# Patient Record
Sex: FEMALE | Race: WHITE | NOT HISPANIC OR LATINO | Employment: OTHER | URBAN - METROPOLITAN AREA
[De-identification: names, ages, dates, MRNs, and addresses within clinical notes are randomized per-mention and may not be internally consistent; named-entity substitution may affect disease eponyms.]

---

## 2018-08-30 ENCOUNTER — OFFICE VISIT (OUTPATIENT)
Dept: MEDICAL GROUP | Facility: PHYSICIAN GROUP | Age: 68
End: 2018-08-30
Payer: MEDICARE

## 2018-08-30 VITALS
BODY MASS INDEX: 27.32 KG/M2 | RESPIRATION RATE: 18 BRPM | DIASTOLIC BLOOD PRESSURE: 70 MMHG | HEART RATE: 71 BPM | OXYGEN SATURATION: 95 % | SYSTOLIC BLOOD PRESSURE: 142 MMHG | HEIGHT: 66 IN | WEIGHT: 170 LBS | TEMPERATURE: 98.9 F

## 2018-08-30 DIAGNOSIS — R42 VERTIGO: ICD-10-CM

## 2018-08-30 PROCEDURE — 99214 OFFICE O/P EST MOD 30 MIN: CPT | Performed by: FAMILY MEDICINE

## 2018-08-30 RX ORDER — MECLIZINE HYDROCHLORIDE 25 MG/1
25 TABLET ORAL 3 TIMES DAILY PRN
Qty: 30 TAB | Refills: 0 | Status: SHIPPED | OUTPATIENT
Start: 2018-08-30 | End: 2022-04-11

## 2018-08-30 ASSESSMENT — ENCOUNTER SYMPTOMS
HEARTBURN: 0
DEPRESSION: 0
BLURRED VISION: 0
DIZZINESS: 1
ABDOMINAL PAIN: 0
MYALGIAS: 0
NAUSEA: 0
CARDIOVASCULAR NEGATIVE: 1
CONSTITUTIONAL NEGATIVE: 1
CHANGE IN BOWEL HABIT: 0
TINGLING: 0
COUGH: 0
BRUISES/BLEEDS EASILY: 0
FEVER: 0
PALPITATIONS: 0
HEMOPTYSIS: 0
CHILLS: 0
ARTHRALGIAS: 0
DOUBLE VISION: 0
GASTROINTESTINAL NEGATIVE: 1
EYES NEGATIVE: 1
HEADACHES: 0
PSYCHIATRIC NEGATIVE: 1
RESPIRATORY NEGATIVE: 1
MUSCULOSKELETAL NEGATIVE: 1

## 2018-08-30 ASSESSMENT — PATIENT HEALTH QUESTIONNAIRE - PHQ9: CLINICAL INTERPRETATION OF PHQ2 SCORE: 0

## 2018-08-30 NOTE — PROGRESS NOTES
Subjective:      Yamila Srinivasan is a 68 y.o. female who presents with Dizziness            Many year history of vertigo, worse when lying in bed and moving head and neck up  No other neuro sx, seems to be worse lately, no cough or sob  1. Vertigo  =  - CT-CTA HEAD WITH & W/O-POST PROCESS; Future  - CT-HEAD W/O; Future  - COMP METABOLIC PANEL; Future  - FREE THYROXINE; Future  - TRIIDOTHYRONINE; Future  - TSH; Future  - CBC WITH DIFFERENTIAL; Future  - meclizine (ANTIVERT) 25 MG Tab; Take 1 Tab by mouth 3 times a day as needed.  Dispense: 30 Tab; Refill: 0    Past Medical History:  No date: Arthritis  Past Surgical History:  No date: CHOLECYSTECTOMY  Smoking status: Never Smoker                                                              Smokeless tobacco: Never Used                      Alcohol use: No              Review of patient's family history indicates:  Problem: Diabetes      Relation: Mother       Age of Onset: (Not Specified)       Current Outpatient Prescriptions: •  meclizine (ANTIVERT) 25 MG Tab, Take 1 Tab by mouth 3 times a day as needed., Disp: 30 Tab, Rfl: 0•  naproxen (NAPROSYN) 500 MG Tab, Take 1 Tab by mouth 2 times a day, with meals., Disp: 60 Tab, Rfl: 3•  etodolac (LODINE) 200 MG Cap, Take 1 Cap by mouth 2 times a day., Disp: 60 Cap, Rfl: 3•  NON SPECIFIED, Apply 1 Units to affected area(s) every day., Disp: 1 Units, Rfl: 0    Patient was instructed on the use of medications, either prescriptions or OTC and informed on when the appropriate follow up time period should be. In addition, patient was also instructed that should any acute worsening occur that they should notify this clinic asap or call 911.          Dizziness   This is a chronic problem. The current episode started more than 1 year ago. The problem occurs intermittently. The problem has been waxing and waning. Pertinent negatives include no abdominal pain, arthralgias, change in bowel habit, chest pain, chills, coughing,  "fever, headaches, myalgias, nausea or rash. The symptoms are aggravated by exertion.       Review of Systems   Constitutional: Negative.  Negative for chills and fever.   HENT: Negative.  Negative for hearing loss.    Eyes: Negative.  Negative for blurred vision and double vision.   Respiratory: Negative.  Negative for cough and hemoptysis.    Cardiovascular: Negative.  Negative for chest pain and palpitations.   Gastrointestinal: Negative.  Negative for abdominal pain, change in bowel habit, heartburn and nausea.   Genitourinary: Negative.  Negative for dysuria.   Musculoskeletal: Negative.  Negative for arthralgias and myalgias.   Skin: Negative.  Negative for rash.   Neurological: Positive for dizziness. Negative for tingling and headaches.   Endo/Heme/Allergies: Negative.  Does not bruise/bleed easily.   Psychiatric/Behavioral: Negative.  Negative for depression and suicidal ideas.   All other systems reviewed and are negative.         Objective:     /70   Pulse 71   Temp 37.2 °C (98.9 °F)   Resp 18   Ht 1.676 m (5' 6\")   Wt 77.1 kg (170 lb)   SpO2 95%   BMI 27.44 kg/m²      Physical Exam   Constitutional: She is oriented to person, place, and time. She appears well-developed and well-nourished. No distress.   HENT:   Head: Normocephalic and atraumatic.   Mouth/Throat: Oropharynx is clear and moist. No oropharyngeal exudate.   Eyes: Pupils are equal, round, and reactive to light.   Cardiovascular: Normal rate, regular rhythm, normal heart sounds and intact distal pulses.  Exam reveals no gallop and no friction rub.    No murmur heard.  Pulmonary/Chest: Effort normal and breath sounds normal. No respiratory distress. She has no wheezes. She has no rales. She exhibits no tenderness.   Neurological: She is alert and oriented to person, place, and time. She displays normal reflexes. No cranial nerve deficit or sensory deficit. She exhibits normal muscle tone. Coordination normal.   Skin: She is not " diaphoretic.   Psychiatric: She has a normal mood and affect. Her behavior is normal. Judgment and thought content normal.   Nursing note and vitals reviewed.              Assessment/Plan:     1. Vertigo  - CT-CTA HEAD WITH & W/O-POST PROCESS; Future  - CT-HEAD W/O; Future  - COMP METABOLIC PANEL; Future  - FREE THYROXINE; Future  - TRIIDOTHYRONINE; Future  - TSH; Future  - CBC WITH DIFFERENTIAL; Future  - meclizine (ANTIVERT) 25 MG Tab; Take 1 Tab by mouth 3 times a day as needed.  Dispense: 30 Tab; Refill: 0

## 2018-08-31 ENCOUNTER — HOSPITAL ENCOUNTER (OUTPATIENT)
Dept: LAB | Facility: MEDICAL CENTER | Age: 68
End: 2018-08-31
Attending: FAMILY MEDICINE
Payer: MEDICARE

## 2018-08-31 DIAGNOSIS — R42 VERTIGO: ICD-10-CM

## 2018-08-31 LAB
ALBUMIN SERPL BCP-MCNC: 3.9 G/DL (ref 3.2–4.9)
ALBUMIN/GLOB SERPL: 1.3 G/DL
ALP SERPL-CCNC: 59 U/L (ref 30–99)
ALT SERPL-CCNC: 38 U/L (ref 2–50)
ANION GAP SERPL CALC-SCNC: 7 MMOL/L (ref 0–11.9)
AST SERPL-CCNC: 25 U/L (ref 12–45)
BASOPHILS # BLD AUTO: 0.6 % (ref 0–1.8)
BASOPHILS # BLD: 0.04 K/UL (ref 0–0.12)
BILIRUB SERPL-MCNC: 0.7 MG/DL (ref 0.1–1.5)
BUN SERPL-MCNC: 12 MG/DL (ref 8–22)
CALCIUM SERPL-MCNC: 8.5 MG/DL (ref 8.5–10.5)
CHLORIDE SERPL-SCNC: 104 MMOL/L (ref 96–112)
CO2 SERPL-SCNC: 26 MMOL/L (ref 20–33)
CREAT SERPL-MCNC: 0.71 MG/DL (ref 0.5–1.4)
EOSINOPHIL # BLD AUTO: 0.55 K/UL (ref 0–0.51)
EOSINOPHIL NFR BLD: 8.1 % (ref 0–6.9)
ERYTHROCYTE [DISTWIDTH] IN BLOOD BY AUTOMATED COUNT: 41.3 FL (ref 35.9–50)
GLOBULIN SER CALC-MCNC: 3.1 G/DL (ref 1.9–3.5)
GLUCOSE SERPL-MCNC: 114 MG/DL (ref 65–99)
HCT VFR BLD AUTO: 42.7 % (ref 37–47)
HGB BLD-MCNC: 14 G/DL (ref 12–16)
IMM GRANULOCYTES # BLD AUTO: 0.02 K/UL (ref 0–0.11)
IMM GRANULOCYTES NFR BLD AUTO: 0.3 % (ref 0–0.9)
LYMPHOCYTES # BLD AUTO: 2.41 K/UL (ref 1–4.8)
LYMPHOCYTES NFR BLD: 35.7 % (ref 22–41)
MCH RBC QN AUTO: 29.2 PG (ref 27–33)
MCHC RBC AUTO-ENTMCNC: 32.8 G/DL (ref 33.6–35)
MCV RBC AUTO: 89.1 FL (ref 81.4–97.8)
MONOCYTES # BLD AUTO: 0.54 K/UL (ref 0–0.85)
MONOCYTES NFR BLD AUTO: 8 % (ref 0–13.4)
NEUTROPHILS # BLD AUTO: 3.19 K/UL (ref 2–7.15)
NEUTROPHILS NFR BLD: 47.3 % (ref 44–72)
NRBC # BLD AUTO: 0 K/UL
NRBC BLD-RTO: 0 /100 WBC
PLATELET # BLD AUTO: 340 K/UL (ref 164–446)
PMV BLD AUTO: 8.7 FL (ref 9–12.9)
POTASSIUM SERPL-SCNC: 3.9 MMOL/L (ref 3.6–5.5)
PROT SERPL-MCNC: 7 G/DL (ref 6–8.2)
RBC # BLD AUTO: 4.79 M/UL (ref 4.2–5.4)
SODIUM SERPL-SCNC: 137 MMOL/L (ref 135–145)
T3 SERPL-MCNC: 89.4 NG/DL (ref 60–181)
T4 FREE SERPL-MCNC: 0.84 NG/DL (ref 0.53–1.43)
TSH SERPL DL<=0.005 MIU/L-ACNC: 2.03 UIU/ML (ref 0.38–5.33)
WBC # BLD AUTO: 6.8 K/UL (ref 4.8–10.8)

## 2018-08-31 PROCEDURE — 80053 COMPREHEN METABOLIC PANEL: CPT

## 2018-08-31 PROCEDURE — 84443 ASSAY THYROID STIM HORMONE: CPT | Mod: GA

## 2018-08-31 PROCEDURE — 84480 ASSAY TRIIODOTHYRONINE (T3): CPT

## 2018-08-31 PROCEDURE — 85025 COMPLETE CBC W/AUTO DIFF WBC: CPT

## 2018-08-31 PROCEDURE — 84439 ASSAY OF FREE THYROXINE: CPT | Mod: GA

## 2018-08-31 PROCEDURE — 36415 COLL VENOUS BLD VENIPUNCTURE: CPT

## 2018-09-04 ENCOUNTER — TELEPHONE (OUTPATIENT)
Dept: MEDICAL GROUP | Facility: PHYSICIAN GROUP | Age: 68
End: 2018-09-04

## 2019-04-25 DIAGNOSIS — Z12.31 VISIT FOR SCREENING MAMMOGRAM: ICD-10-CM

## 2019-05-15 ENCOUNTER — HOSPITAL ENCOUNTER (OUTPATIENT)
Dept: LAB | Facility: MEDICAL CENTER | Age: 69
End: 2019-05-15
Attending: FAMILY MEDICINE
Payer: MEDICARE

## 2019-05-15 ENCOUNTER — OFFICE VISIT (OUTPATIENT)
Dept: MEDICAL GROUP | Facility: PHYSICIAN GROUP | Age: 69
End: 2019-05-15
Payer: MEDICARE

## 2019-05-15 VITALS
BODY MASS INDEX: 27.64 KG/M2 | OXYGEN SATURATION: 95 % | WEIGHT: 172 LBS | HEIGHT: 66 IN | SYSTOLIC BLOOD PRESSURE: 140 MMHG | RESPIRATION RATE: 12 BRPM | TEMPERATURE: 97.5 F | HEART RATE: 76 BPM | DIASTOLIC BLOOD PRESSURE: 70 MMHG

## 2019-05-15 DIAGNOSIS — B35.4 TINEA CORPORIS: ICD-10-CM

## 2019-05-15 DIAGNOSIS — K92.1 BLOOD IN STOOL: ICD-10-CM

## 2019-05-15 DIAGNOSIS — Z78.0 POST-MENOPAUSAL: ICD-10-CM

## 2019-05-15 LAB
ALBUMIN SERPL BCP-MCNC: 4.1 G/DL (ref 3.2–4.9)
ALBUMIN/GLOB SERPL: 1.3 G/DL
ALP SERPL-CCNC: 66 U/L (ref 30–99)
ALT SERPL-CCNC: 27 U/L (ref 2–50)
ANION GAP SERPL CALC-SCNC: 9 MMOL/L (ref 0–11.9)
AST SERPL-CCNC: 18 U/L (ref 12–45)
BASOPHILS # BLD AUTO: 0.7 % (ref 0–1.8)
BASOPHILS # BLD: 0.05 K/UL (ref 0–0.12)
BILIRUB SERPL-MCNC: 0.4 MG/DL (ref 0.1–1.5)
BUN SERPL-MCNC: 15 MG/DL (ref 8–22)
CALCIUM SERPL-MCNC: 8.6 MG/DL (ref 8.5–10.5)
CHLORIDE SERPL-SCNC: 106 MMOL/L (ref 96–112)
CO2 SERPL-SCNC: 25 MMOL/L (ref 20–33)
CREAT SERPL-MCNC: 0.6 MG/DL (ref 0.5–1.4)
EOSINOPHIL # BLD AUTO: 0.32 K/UL (ref 0–0.51)
EOSINOPHIL NFR BLD: 4.3 % (ref 0–6.9)
ERYTHROCYTE [DISTWIDTH] IN BLOOD BY AUTOMATED COUNT: 41.8 FL (ref 35.9–50)
FASTING STATUS PATIENT QL REPORTED: NORMAL
GLOBULIN SER CALC-MCNC: 3.1 G/DL (ref 1.9–3.5)
GLUCOSE SERPL-MCNC: 109 MG/DL (ref 65–99)
HCT VFR BLD AUTO: 46.2 % (ref 37–47)
HGB BLD-MCNC: 14.4 G/DL (ref 12–16)
IMM GRANULOCYTES # BLD AUTO: 0.03 K/UL (ref 0–0.11)
IMM GRANULOCYTES NFR BLD AUTO: 0.4 % (ref 0–0.9)
LYMPHOCYTES # BLD AUTO: 1.83 K/UL (ref 1–4.8)
LYMPHOCYTES NFR BLD: 24.8 % (ref 22–41)
MCH RBC QN AUTO: 28 PG (ref 27–33)
MCHC RBC AUTO-ENTMCNC: 31.2 G/DL (ref 33.6–35)
MCV RBC AUTO: 89.7 FL (ref 81.4–97.8)
MONOCYTES # BLD AUTO: 0.5 K/UL (ref 0–0.85)
MONOCYTES NFR BLD AUTO: 6.8 % (ref 0–13.4)
NEUTROPHILS # BLD AUTO: 4.64 K/UL (ref 2–7.15)
NEUTROPHILS NFR BLD: 63 % (ref 44–72)
NRBC # BLD AUTO: 0 K/UL
NRBC BLD-RTO: 0 /100 WBC
PLATELET # BLD AUTO: 367 K/UL (ref 164–446)
PMV BLD AUTO: 8.6 FL (ref 9–12.9)
POTASSIUM SERPL-SCNC: 4.2 MMOL/L (ref 3.6–5.5)
PROT SERPL-MCNC: 7.2 G/DL (ref 6–8.2)
RBC # BLD AUTO: 5.15 M/UL (ref 4.2–5.4)
SODIUM SERPL-SCNC: 140 MMOL/L (ref 135–145)
WBC # BLD AUTO: 7.4 K/UL (ref 4.8–10.8)

## 2019-05-15 PROCEDURE — 99214 OFFICE O/P EST MOD 30 MIN: CPT | Performed by: FAMILY MEDICINE

## 2019-05-15 PROCEDURE — 85025 COMPLETE CBC W/AUTO DIFF WBC: CPT

## 2019-05-15 PROCEDURE — 80053 COMPREHEN METABOLIC PANEL: CPT

## 2019-05-15 PROCEDURE — 36415 COLL VENOUS BLD VENIPUNCTURE: CPT

## 2019-05-15 RX ORDER — CLOTRIMAZOLE AND BETAMETHASONE DIPROPIONATE 10; .64 MG/G; MG/G
1 CREAM TOPICAL 2 TIMES DAILY
Qty: 1 TUBE | Refills: 4 | Status: SHIPPED | OUTPATIENT
Start: 2019-05-15 | End: 2022-04-11

## 2019-05-15 ASSESSMENT — ENCOUNTER SYMPTOMS
HEMOPTYSIS: 0
CONSTITUTIONAL NEGATIVE: 1
BLURRED VISION: 0
BLOOD IN STOOL: 1
RESPIRATORY NEGATIVE: 1
CHILLS: 0
FEVER: 0
EYES NEGATIVE: 1
DIZZINESS: 0
COUGH: 0
DEPRESSION: 0
NAUSEA: 0
CARDIOVASCULAR NEGATIVE: 1
MUSCULOSKELETAL NEGATIVE: 1
PALPITATIONS: 0
DOUBLE VISION: 0
NEUROLOGICAL NEGATIVE: 1
PSYCHIATRIC NEGATIVE: 1
HEADACHES: 0
TINGLING: 0
MYALGIAS: 0
BRUISES/BLEEDS EASILY: 0
HEARTBURN: 0

## 2019-05-15 NOTE — PROGRESS NOTES
Subjective:      Yamila Srinivasan is a 69 y.o. female who presents with Back Pain; Rectal Bleeding; Nodule (on arm ); and Fatigue            1. Post-menopausal    - DS-BONE DENSITY STUDY (DEXA); Future    2. Tinea corporis  Rash on left arm for past 5 mo  4 mm by 4 mm scaly patch on left ventral forearm  - clotrimazole-betamethasone (LOTRISONE) 1-0.05 % Cream; Apply 1 g to affected area(s) 2 times a day.  Dispense: 1 Tube; Refill: 4    3. Blood in stool  Notices blood in stool over past several months intermittently with BM  No constitutional sx  2016 colonoscopy with grade 1 internal hemorrhoid  Today on exam external hemorrhoids present and guaiac neg  Will need new eval by gi to see if this past noticed internal one is the cause vs something else  - REFERRAL TO GASTROENTEROLOGY  - Comp Metabolic Panel; Future  - CBC WITH DIFFERENTIAL; Future    Past Medical History:  No date: Arthritis  Past Surgical History:  No date: CHOLECYSTECTOMY  Smoking status: Never Smoker                                                              Smokeless tobacco: Never Used                      Alcohol use: No              Review of patient's family history indicates:  Problem: Diabetes      Relation: Mother       Age of Onset: (Not Specified)       Current Outpatient Prescriptions: •  clotrimazole-betamethasone (LOTRISONE) 1-0.05 % Cream, Apply 1 g to affected area(s) 2 times a day., Disp: 1 Tube, Rfl: 4•  meclizine (ANTIVERT) 25 MG Tab, Take 1 Tab by mouth 3 times a day as needed. (Patient not taking: Reported on 5/15/2019), Disp: 30 Tab, Rfl: 0•  naproxen (NAPROSYN) 500 MG Tab, Take 1 Tab by mouth 2 times a day, with meals. (Patient not taking: Reported on 5/15/2019), Disp: 60 Tab, Rfl: 3•  etodolac (LODINE) 200 MG Cap, Take 1 Cap by mouth 2 times a day. (Patient not taking: Reported on 5/15/2019), Disp: 60 Cap, Rfl: 3•  NON SPECIFIED, Apply 1 Units to affected area(s) every day. (Patient not taking: Reported on 5/15/2019),  "Disp: 1 Units, Rfl: 0    Patient was instructed on the use of medications, either prescriptions or OTC and informed on when the appropriate follow up time period should be. In addition, patient was also instructed that should any acute worsening occur that they should notify this clinic asap or call 911.            Review of Systems   Constitutional: Negative.  Negative for chills and fever.   HENT: Negative.  Negative for hearing loss.    Eyes: Negative.  Negative for blurred vision and double vision.   Respiratory: Negative.  Negative for cough and hemoptysis.    Cardiovascular: Negative.  Negative for chest pain and palpitations.   Gastrointestinal: Positive for blood in stool. Negative for heartburn and nausea.   Genitourinary: Negative.  Negative for dysuria.   Musculoskeletal: Negative.  Negative for myalgias.   Skin: Positive for itching and rash.   Neurological: Negative.  Negative for dizziness, tingling and headaches.   Endo/Heme/Allergies: Negative.  Does not bruise/bleed easily.   Psychiatric/Behavioral: Negative.  Negative for depression and suicidal ideas.   All other systems reviewed and are negative.         Objective:     /70   Pulse 76   Temp 36.4 °C (97.5 °F)   Resp 12   Ht 1.676 m (5' 6\")   Wt 78 kg (172 lb)   SpO2 95%   BMI 27.76 kg/m²      Physical Exam   Constitutional: She is oriented to person, place, and time. She appears well-developed and well-nourished. No distress.   HENT:   Head: Normocephalic and atraumatic.   Mouth/Throat: Oropharynx is clear and moist. No oropharyngeal exudate.   Eyes: Pupils are equal, round, and reactive to light.   Cardiovascular: Normal rate, regular rhythm, normal heart sounds and intact distal pulses.  Exam reveals no gallop and no friction rub.    No murmur heard.  Pulmonary/Chest: Effort normal and breath sounds normal. No respiratory distress. She has no wheezes. She has no rales. She exhibits no tenderness.   Genitourinary: Rectal exam shows " external hemorrhoid. Rectal exam shows guaiac negative stool.   Neurological: She is alert and oriented to person, place, and time.   Skin: She is not diaphoretic.        Psychiatric: She has a normal mood and affect. Her behavior is normal. Judgment and thought content normal.   Nursing note and vitals reviewed.              Assessment/Plan:     1. Post-menopausal    - DS-BONE DENSITY STUDY (DEXA); Future    2. Tinea corporis    - clotrimazole-betamethasone (LOTRISONE) 1-0.05 % Cream; Apply 1 g to affected area(s) 2 times a day.  Dispense: 1 Tube; Refill: 4    3. Blood in stool    - REFERRAL TO GASTROENTEROLOGY  - Comp Metabolic Panel; Future  - CBC WITH DIFFERENTIAL; Future

## 2019-05-16 ENCOUNTER — TELEPHONE (OUTPATIENT)
Dept: MEDICAL GROUP | Facility: PHYSICIAN GROUP | Age: 69
End: 2019-05-16

## 2019-05-17 NOTE — TELEPHONE ENCOUNTER
----- Message from Daniel Zuleta M.D. sent at 5/16/2019  1:15 PM PDT -----  Red blood cell count ok

## 2020-10-21 ENCOUNTER — OFFICE VISIT (OUTPATIENT)
Dept: MEDICAL GROUP | Facility: PHYSICIAN GROUP | Age: 70
End: 2020-10-21
Payer: MEDICARE

## 2020-10-21 ENCOUNTER — NURSE TRIAGE (OUTPATIENT)
Dept: HEALTH INFORMATION MANAGEMENT | Facility: OTHER | Age: 70
End: 2020-10-21

## 2020-10-21 VITALS
TEMPERATURE: 98 F | DIASTOLIC BLOOD PRESSURE: 72 MMHG | OXYGEN SATURATION: 96 % | HEART RATE: 77 BPM | WEIGHT: 174.4 LBS | SYSTOLIC BLOOD PRESSURE: 140 MMHG | RESPIRATION RATE: 20 BRPM | BODY MASS INDEX: 28.03 KG/M2 | HEIGHT: 66 IN

## 2020-10-21 DIAGNOSIS — R53.81 MALAISE AND FATIGUE: ICD-10-CM

## 2020-10-21 DIAGNOSIS — R53.83 MALAISE AND FATIGUE: ICD-10-CM

## 2020-10-21 PROCEDURE — 99214 OFFICE O/P EST MOD 30 MIN: CPT | Performed by: FAMILY MEDICINE

## 2020-10-21 RX ORDER — CEPHALEXIN 500 MG/1
500 CAPSULE ORAL 2 TIMES DAILY
COMMUNITY
Start: 2020-10-19 | End: 2022-04-11

## 2020-10-21 ASSESSMENT — ENCOUNTER SYMPTOMS
NAUSEA: 0
MYALGIAS: 0
CARDIOVASCULAR NEGATIVE: 1
EYES NEGATIVE: 1
BLURRED VISION: 0
PSYCHIATRIC NEGATIVE: 1
CHILLS: 0
GASTROINTESTINAL NEGATIVE: 1
MUSCULOSKELETAL NEGATIVE: 1
DEPRESSION: 0
DOUBLE VISION: 0
RESPIRATORY NEGATIVE: 1
DIZZINESS: 0
HEMOPTYSIS: 0
HEARTBURN: 0
COUGH: 0
NEUROLOGICAL NEGATIVE: 1
HEADACHES: 0
BRUISES/BLEEDS EASILY: 0
TINGLING: 0
FEVER: 0
PALPITATIONS: 0

## 2020-10-21 ASSESSMENT — PATIENT HEALTH QUESTIONNAIRE - PHQ9: CLINICAL INTERPRETATION OF PHQ2 SCORE: 0

## 2020-10-21 ASSESSMENT — FIBROSIS 4 INDEX: FIB4 SCORE: 0.64

## 2020-10-21 NOTE — TELEPHONE ENCOUNTER
1. Caller Name:Yamila Srinivasan                 Call Back Number:  [facility] PCP or Specialty Provider: Yes         2.  In the last two weeks, has the patient had any new or worsening symptoms (not explained by alternative diagnosis)? Yes, the patient reports the following COVID-19 consistent symptoms: fatigue.    3.  Does patient have any comoribidities? Sleep apnea, UTI on antibiotics    4.  Has the patient traveled in the last 14 days OR had any known contact with someone who is suspected or confirmed to have COVID-19?  No.    5. Disposition: Cleared by RN Triage as potential is low for COVID-19; OK to keep/schedule appointment    Note routed to [facility] Provider: BRIGHT only.

## 2020-10-21 NOTE — PROGRESS NOTES
Subjective:      Yamila Srinivasan is a 70 y.o. female who presents with Tired (No energy since Sunday/complete exhaustion)            1. Malaise and fatigue  Very fatigued for past 4 days, started in the morning and also with a uti that is being treated by keflex  No energy  Denies fever or chills or cough or bleeding  - TSH; Future  - TRIIDOTHYRONINE; Future  - VITAMIN D,25 HYDROXY; Future  - FREE THYROXINE; Future  - Comp Metabolic Panel; Future  - CBC WITH DIFFERENTIAL; Future  - VITAMIN B12; Future  - FOLATE; Future    Past Medical History:  No date: Arthritis  Past Surgical History:  No date: CHOLECYSTECTOMY  Social History    Tobacco Use      Smoking status: Never Smoker      Smokeless tobacco: Never Used    Alcohol use: No    Drug use: No    Review of patient's family history indicates:  Problem: Diabetes      Relation: Mother          Age of Onset: (Not Specified)      Current Outpatient Medications: •  cephALEXin (KEFLEX) 500 MG Cap, Take 500 mg by mouth 2 Times a Day., Disp: , Rfl: •  clotrimazole-betamethasone (LOTRISONE) 1-0.05 % Cream, Apply 1 g to affected area(s) 2 times a day. (Patient not taking: Reported on 10/21/2020), Disp: 1 Tube, Rfl: 4•  meclizine (ANTIVERT) 25 MG Tab, Take 1 Tab by mouth 3 times a day as needed. (Patient not taking: Reported on 5/15/2019), Disp: 30 Tab, Rfl: 0•  naproxen (NAPROSYN) 500 MG Tab, Take 1 Tab by mouth 2 times a day, with meals. (Patient not taking: Reported on 5/15/2019), Disp: 60 Tab, Rfl: 3•  etodolac (LODINE) 200 MG Cap, Take 1 Cap by mouth 2 times a day. (Patient not taking: Reported on 5/15/2019), Disp: 60 Cap, Rfl: 3•  NON SPECIFIED, Apply 1 Units to affected area(s) every day. (Patient not taking: Reported on 5/15/2019), Disp: 1 Units, Rfl: 0    Patient was instructed on the use of medications, either prescriptions or OTC and informed on when the appropriate follow up time period should be. In addition, patient was also instructed that should any acute  "worsening occur that they should notify this clinic asap or call 911.          Review of Systems   Constitutional: Positive for malaise/fatigue. Negative for chills and fever.   HENT: Negative.  Negative for hearing loss.    Eyes: Negative.  Negative for blurred vision and double vision.   Respiratory: Negative.  Negative for cough and hemoptysis.    Cardiovascular: Negative.  Negative for chest pain and palpitations.   Gastrointestinal: Negative.  Negative for heartburn and nausea.   Genitourinary: Positive for dysuria.   Musculoskeletal: Negative.  Negative for myalgias.   Skin: Negative.  Negative for rash.   Neurological: Negative.  Negative for dizziness, tingling and headaches.   Endo/Heme/Allergies: Negative.  Does not bruise/bleed easily.   Psychiatric/Behavioral: Negative.  Negative for depression and suicidal ideas.   All other systems reviewed and are negative.         Objective:     /72 (BP Location: Left arm, Patient Position: Sitting, BP Cuff Size: Adult)   Pulse 77   Temp 36.7 °C (98 °F) (Temporal)   Resp 20   Ht 1.664 m (5' 5.5\")   Wt 79.1 kg (174 lb 6.4 oz)   SpO2 96%   BMI 28.58 kg/m²      Physical Exam  Vitals signs and nursing note reviewed.   Constitutional:       General: She is not in acute distress.     Appearance: She is well-developed. She is not diaphoretic.   HENT:      Head: Normocephalic and atraumatic.      Mouth/Throat:      Pharynx: No oropharyngeal exudate.   Eyes:      Pupils: Pupils are equal, round, and reactive to light.   Cardiovascular:      Rate and Rhythm: Normal rate and regular rhythm.      Heart sounds: Normal heart sounds. No murmur. No friction rub. No gallop.    Pulmonary:      Effort: Pulmonary effort is normal. No respiratory distress.      Breath sounds: Normal breath sounds. No wheezing or rales.   Chest:      Chest wall: No tenderness.   Neurological:      Mental Status: She is alert and oriented to person, place, and time.   Psychiatric:         " Behavior: Behavior normal.         Thought Content: Thought content normal.         Judgment: Judgment normal.                 Assessment/Plan:        1. Malaise and fatigue    - TSH; Future  - TRIIDOTHYRONINE; Future  - VITAMIN D,25 HYDROXY; Future  - FREE THYROXINE; Future  - Comp Metabolic Panel; Future  - CBC WITH DIFFERENTIAL; Future  - VITAMIN B12; Future  - FOLATE; Future

## 2020-10-22 ENCOUNTER — HOSPITAL ENCOUNTER (OUTPATIENT)
Dept: LAB | Facility: MEDICAL CENTER | Age: 70
End: 2020-10-22
Attending: FAMILY MEDICINE
Payer: MEDICARE

## 2020-10-22 DIAGNOSIS — R53.81 MALAISE AND FATIGUE: ICD-10-CM

## 2020-10-22 DIAGNOSIS — R53.83 MALAISE AND FATIGUE: ICD-10-CM

## 2020-10-22 LAB
25(OH)D3 SERPL-MCNC: 21 NG/ML (ref 30–100)
ALBUMIN SERPL BCP-MCNC: 3.6 G/DL (ref 3.2–4.9)
ALBUMIN/GLOB SERPL: 0.9 G/DL
ALP SERPL-CCNC: 174 U/L (ref 30–99)
ALT SERPL-CCNC: 32 U/L (ref 2–50)
ANION GAP SERPL CALC-SCNC: 15 MMOL/L (ref 7–16)
AST SERPL-CCNC: 22 U/L (ref 12–45)
BASOPHILS # BLD AUTO: 0.6 % (ref 0–1.8)
BASOPHILS # BLD: 0.06 K/UL (ref 0–0.12)
BILIRUB SERPL-MCNC: 0.4 MG/DL (ref 0.1–1.5)
BUN SERPL-MCNC: 14 MG/DL (ref 8–22)
CALCIUM SERPL-MCNC: 8.9 MG/DL (ref 8.5–10.5)
CHLORIDE SERPL-SCNC: 99 MMOL/L (ref 96–112)
CO2 SERPL-SCNC: 26 MMOL/L (ref 20–33)
CREAT SERPL-MCNC: 0.71 MG/DL (ref 0.5–1.4)
EOSINOPHIL # BLD AUTO: 0.23 K/UL (ref 0–0.51)
EOSINOPHIL NFR BLD: 2.3 % (ref 0–6.9)
ERYTHROCYTE [DISTWIDTH] IN BLOOD BY AUTOMATED COUNT: 43.6 FL (ref 35.9–50)
FOLATE SERPL-MCNC: 17.8 NG/ML
GLOBULIN SER CALC-MCNC: 4.1 G/DL (ref 1.9–3.5)
GLUCOSE SERPL-MCNC: 94 MG/DL (ref 65–99)
HCT VFR BLD AUTO: 40.8 % (ref 37–47)
HGB BLD-MCNC: 12.8 G/DL (ref 12–16)
IMM GRANULOCYTES # BLD AUTO: 0.12 K/UL (ref 0–0.11)
IMM GRANULOCYTES NFR BLD AUTO: 1.2 % (ref 0–0.9)
LYMPHOCYTES # BLD AUTO: 2.01 K/UL (ref 1–4.8)
LYMPHOCYTES NFR BLD: 19.8 % (ref 22–41)
MCH RBC QN AUTO: 28.4 PG (ref 27–33)
MCHC RBC AUTO-ENTMCNC: 31.4 G/DL (ref 33.6–35)
MCV RBC AUTO: 90.5 FL (ref 81.4–97.8)
MONOCYTES # BLD AUTO: 1.1 K/UL (ref 0–0.85)
MONOCYTES NFR BLD AUTO: 10.9 % (ref 0–13.4)
NEUTROPHILS # BLD AUTO: 6.61 K/UL (ref 2–7.15)
NEUTROPHILS NFR BLD: 65.2 % (ref 44–72)
NRBC # BLD AUTO: 0 K/UL
NRBC BLD-RTO: 0 /100 WBC
PLATELET # BLD AUTO: 412 K/UL (ref 164–446)
PMV BLD AUTO: 9 FL (ref 9–12.9)
POTASSIUM SERPL-SCNC: 3.4 MMOL/L (ref 3.6–5.5)
PROT SERPL-MCNC: 7.7 G/DL (ref 6–8.2)
RBC # BLD AUTO: 4.51 M/UL (ref 4.2–5.4)
SODIUM SERPL-SCNC: 140 MMOL/L (ref 135–145)
T3 SERPL-MCNC: 81.9 NG/DL (ref 60–181)
T4 FREE SERPL-MCNC: 1.3 NG/DL (ref 0.93–1.7)
TSH SERPL DL<=0.005 MIU/L-ACNC: 2.66 UIU/ML (ref 0.38–5.33)
VIT B12 SERPL-MCNC: 2366 PG/ML (ref 211–911)
WBC # BLD AUTO: 10.1 K/UL (ref 4.8–10.8)

## 2020-10-22 PROCEDURE — 85025 COMPLETE CBC W/AUTO DIFF WBC: CPT

## 2020-10-22 PROCEDURE — 84439 ASSAY OF FREE THYROXINE: CPT

## 2020-10-22 PROCEDURE — 82607 VITAMIN B-12: CPT

## 2020-10-22 PROCEDURE — 82746 ASSAY OF FOLIC ACID SERUM: CPT

## 2020-10-22 PROCEDURE — 84443 ASSAY THYROID STIM HORMONE: CPT

## 2020-10-22 PROCEDURE — 80053 COMPREHEN METABOLIC PANEL: CPT

## 2020-10-22 PROCEDURE — 36415 COLL VENOUS BLD VENIPUNCTURE: CPT

## 2020-10-22 PROCEDURE — 82306 VITAMIN D 25 HYDROXY: CPT | Mod: GA

## 2020-10-22 PROCEDURE — 84480 ASSAY TRIIODOTHYRONINE (T3): CPT

## 2020-10-26 ENCOUNTER — TELEPHONE (OUTPATIENT)
Dept: MEDICAL GROUP | Facility: PHYSICIAN GROUP | Age: 70
End: 2020-10-26

## 2020-10-26 NOTE — TELEPHONE ENCOUNTER
Phone Number Called: [phone] (home) [phone] (work)      Call outcome: Spoke to patient regarding message below.    Message: Pt answered the phone and when I said hello and started to introduce myself she said she already voted and hung up on me; will send pt [employer] message as well

## 2020-10-26 NOTE — TELEPHONE ENCOUNTER
----- Message from Daniel Zuleta M.D. sent at 10/24/2020  5:29 PM PDT -----  This patient needs an appointment within the next week. Please schedule this with the patient so we may discuss their lab results

## 2020-11-03 ENCOUNTER — OFFICE VISIT (OUTPATIENT)
Dept: MEDICAL GROUP | Facility: PHYSICIAN GROUP | Age: 70
End: 2020-11-03
Payer: MEDICARE

## 2020-11-03 VITALS
RESPIRATION RATE: 14 BRPM | TEMPERATURE: 98.2 F | DIASTOLIC BLOOD PRESSURE: 82 MMHG | OXYGEN SATURATION: 97 % | SYSTOLIC BLOOD PRESSURE: 140 MMHG | BODY MASS INDEX: 27.48 KG/M2 | HEART RATE: 87 BPM | HEIGHT: 66 IN | WEIGHT: 171 LBS

## 2020-11-03 DIAGNOSIS — E55.9 VITAMIN D DEFICIENCY: ICD-10-CM

## 2020-11-03 DIAGNOSIS — N30.00 ACUTE CYSTITIS WITHOUT HEMATURIA: ICD-10-CM

## 2020-11-03 DIAGNOSIS — R53.83 MALAISE AND FATIGUE: ICD-10-CM

## 2020-11-03 DIAGNOSIS — R53.81 MALAISE AND FATIGUE: ICD-10-CM

## 2020-11-03 LAB
APPEARANCE UR: NORMAL
BILIRUB UR STRIP-MCNC: NEGATIVE MG/DL
COLOR UR AUTO: NORMAL
GLUCOSE UR STRIP.AUTO-MCNC: NEGATIVE MG/DL
KETONES UR STRIP.AUTO-MCNC: NEGATIVE MG/DL
LEUKOCYTE ESTERASE UR QL STRIP.AUTO: NORMAL
NITRITE UR QL STRIP.AUTO: NEGATIVE
PH UR STRIP.AUTO: 6 [PH] (ref 5–8)
PROT UR QL STRIP: NEGATIVE MG/DL
RBC UR QL AUTO: NORMAL
SP GR UR STRIP.AUTO: 1
UROBILINOGEN UR STRIP-MCNC: 0.2 MG/DL

## 2020-11-03 PROCEDURE — 99214 OFFICE O/P EST MOD 30 MIN: CPT | Performed by: FAMILY MEDICINE

## 2020-11-03 PROCEDURE — 81002 URINALYSIS NONAUTO W/O SCOPE: CPT | Performed by: FAMILY MEDICINE

## 2020-11-03 RX ORDER — SULFAMETHOXAZOLE AND TRIMETHOPRIM 800; 160 MG/1; MG/1
1 TABLET ORAL 2 TIMES DAILY
Qty: 14 TAB | Refills: 0 | Status: SHIPPED | OUTPATIENT
Start: 2020-11-03 | End: 2022-04-11

## 2020-11-03 RX ORDER — CHOLECALCIFEROL (VITAMIN D3) 125 MCG
5000 CAPSULE ORAL DAILY
Qty: 90 CAP | Refills: 1 | Status: SHIPPED | OUTPATIENT
Start: 2020-11-03

## 2020-11-03 ASSESSMENT — ENCOUNTER SYMPTOMS
BLURRED VISION: 0
GASTROINTESTINAL NEGATIVE: 1
FEVER: 0
CHILLS: 0
CARDIOVASCULAR NEGATIVE: 1
PSYCHIATRIC NEGATIVE: 1
MUSCULOSKELETAL NEGATIVE: 1
DIZZINESS: 0
DEPRESSION: 0
EYES NEGATIVE: 1
PALPITATIONS: 0
NAUSEA: 0
TINGLING: 0
DOUBLE VISION: 0
HEMOPTYSIS: 0
BRUISES/BLEEDS EASILY: 0
NEUROLOGICAL NEGATIVE: 1
MYALGIAS: 0
COUGH: 0
HEADACHES: 0
RESPIRATORY NEGATIVE: 1
HEARTBURN: 0

## 2020-11-03 ASSESSMENT — FIBROSIS 4 INDEX: FIB4 SCORE: 0.64

## 2020-11-03 NOTE — PROGRESS NOTES
Subjective:      Yamila Srinivasan is a 70 y.o. female who presents with Lab Results and Dysuria            1. Acute cystitis without hematuria  Recurrent will treat  - POCT Urinalysis  - sulfamethoxazole-trimethoprim (BACTRIM DS) 800-160 MG tablet; Take 1 Tab by mouth 2 times a day.  Dispense: 14 Tab; Refill: 0    2. Vitamin D deficiency  Labs show patient is low on vitamin d, they needs to replace this with 5000 unites  - Cholecalciferol (VITAMIN D) 125 MCG (5000 UT) Cap; Take 5,000 Units by mouth every day.  Dispense: 90 Cap; Refill: 1    3. Malaise and fatigue  No obvious cause in labs encouraged to have cpap settings checked    Past Medical History:  No date: Arthritis  Past Surgical History:  No date: CHOLECYSTECTOMY  Social History    Tobacco Use      Smoking status: Never Smoker      Smokeless tobacco: Never Used    Alcohol use: No    Drug use: No    Review of patient's family history indicates:  Problem: Diabetes      Relation: Mother          Age of Onset: (Not Specified)      Current Outpatient Medications: •  Cholecalciferol (VITAMIN D) 125 MCG (5000 UT) Cap, Take 5,000 Units by mouth every day., Disp: 90 Cap, Rfl: 1•  sulfamethoxazole-trimethoprim (BACTRIM DS) 800-160 MG tablet, Take 1 Tab by mouth 2 times a day., Disp: 14 Tab, Rfl: 0•  cephALEXin (KEFLEX) 500 MG Cap, Take 500 mg by mouth 2 Times a Day., Disp: , Rfl: •  clotrimazole-betamethasone (LOTRISONE) 1-0.05 % Cream, Apply 1 g to affected area(s) 2 times a day. (Patient not taking: Reported on 10/21/2020), Disp: 1 Tube, Rfl: 4•  meclizine (ANTIVERT) 25 MG Tab, Take 1 Tab by mouth 3 times a day as needed. (Patient not taking: Reported on 5/15/2019), Disp: 30 Tab, Rfl: 0•  naproxen (NAPROSYN) 500 MG Tab, Take 1 Tab by mouth 2 times a day, with meals. (Patient not taking: Reported on 5/15/2019), Disp: 60 Tab, Rfl: 3•  etodolac (LODINE) 200 MG Cap, Take 1 Cap by mouth 2 times a day. (Patient not taking: Reported on 5/15/2019), Disp: 60 Cap, Rfl: 3•  " NON SPECIFIED, Apply 1 Units to affected area(s) every day. (Patient not taking: Reported on 5/15/2019), Disp: 1 Units, Rfl: 0    Patient was instructed on the use of medications, either prescriptions or OTC and informed on when the appropriate follow up time period should be. In addition, patient was also instructed that should any acute worsening occur that they should notify this clinic asap or call 911.          Review of Systems   Constitutional: Positive for malaise/fatigue. Negative for chills and fever.   HENT: Negative.  Negative for hearing loss.    Eyes: Negative.  Negative for blurred vision and double vision.   Respiratory: Negative.  Negative for cough and hemoptysis.    Cardiovascular: Negative.  Negative for chest pain and palpitations.   Gastrointestinal: Negative.  Negative for heartburn and nausea.   Genitourinary: Positive for dysuria.   Musculoskeletal: Negative.  Negative for myalgias.   Skin: Negative.  Negative for rash.   Neurological: Negative.  Negative for dizziness, tingling and headaches.   Endo/Heme/Allergies: Negative.  Does not bruise/bleed easily.   Psychiatric/Behavioral: Negative.  Negative for depression and suicidal ideas.   All other systems reviewed and are negative.         Objective:     /82 (BP Location: Left arm, Patient Position: Sitting, BP Cuff Size: Adult)   Pulse 87   Temp 36.8 °C (98.2 °F) (Temporal)   Resp 14   Ht 1.664 m (5' 5.5\")   Wt 77.6 kg (171 lb)   SpO2 97%   BMI 28.02 kg/m²      Physical Exam  Vitals signs and nursing note reviewed.   Constitutional:       General: She is not in acute distress.     Appearance: She is well-developed. She is not diaphoretic.   HENT:      Head: Normocephalic and atraumatic.      Mouth/Throat:      Pharynx: No oropharyngeal exudate.   Eyes:      Pupils: Pupils are equal, round, and reactive to light.   Cardiovascular:      Rate and Rhythm: Normal rate and regular rhythm.      Heart sounds: Normal heart sounds. No " murmur. No friction rub. No gallop.    Pulmonary:      Effort: Pulmonary effort is normal. No respiratory distress.      Breath sounds: Normal breath sounds. No wheezing or rales.   Chest:      Chest wall: No tenderness.   Neurological:      Mental Status: She is alert and oriented to person, place, and time.   Psychiatric:         Behavior: Behavior normal.         Thought Content: Thought content normal.         Judgment: Judgment normal.                 Assessment/Plan:        1. Acute cystitis without hematuria    - POCT Urinalysis  - sulfamethoxazole-trimethoprim (BACTRIM DS) 800-160 MG tablet; Take 1 Tab by mouth 2 times a day.  Dispense: 14 Tab; Refill: 0    2. Vitamin D deficiency    - Cholecalciferol (VITAMIN D) 125 MCG (5000 UT) Cap; Take 5,000 Units by mouth every day.  Dispense: 90 Cap; Refill: 1    3. Malaise and fatigue

## 2021-12-10 ENCOUNTER — PATIENT MESSAGE (OUTPATIENT)
Dept: INTERNAL MEDICINE | Facility: IMAGING CENTER | Age: 71
End: 2021-12-10

## 2021-12-10 NOTE — TELEPHONE ENCOUNTER
----- Message from Daniel Zuleta M.D. sent at 12/9/2021  8:52 AM PST -----  Please inform patient that she had a normal mammo f/u one year

## 2022-03-09 PROBLEM — M25.511 RIGHT SHOULDER PAIN: Status: ACTIVE | Noted: 2022-03-09

## 2022-04-11 ENCOUNTER — OFFICE VISIT (OUTPATIENT)
Dept: MEDICAL GROUP | Facility: PHYSICIAN GROUP | Age: 72
End: 2022-04-11
Payer: MEDICARE

## 2022-04-11 VITALS
RESPIRATION RATE: 12 BRPM | BODY MASS INDEX: 29.16 KG/M2 | OXYGEN SATURATION: 96 % | HEIGHT: 65 IN | SYSTOLIC BLOOD PRESSURE: 130 MMHG | DIASTOLIC BLOOD PRESSURE: 68 MMHG | TEMPERATURE: 97.6 F | WEIGHT: 175 LBS | HEART RATE: 85 BPM

## 2022-04-11 DIAGNOSIS — M24.811 INTERNAL DERANGEMENT OF RIGHT SHOULDER: ICD-10-CM

## 2022-04-11 DIAGNOSIS — Z01.810 PREOP CARDIOVASCULAR EXAM: ICD-10-CM

## 2022-04-11 PROCEDURE — 99214 OFFICE O/P EST MOD 30 MIN: CPT | Performed by: FAMILY MEDICINE

## 2022-04-11 ASSESSMENT — ENCOUNTER SYMPTOMS
DIZZINESS: 0
DOUBLE VISION: 0
TINGLING: 0
PSYCHIATRIC NEGATIVE: 1
PALPITATIONS: 0
MYALGIAS: 0
BRUISES/BLEEDS EASILY: 0
COUGH: 0
DEPRESSION: 0
CARDIOVASCULAR NEGATIVE: 1
EYES NEGATIVE: 1
HEARTBURN: 0
HEADACHES: 0
GASTROINTESTINAL NEGATIVE: 1
CHILLS: 0
FEVER: 0
NEUROLOGICAL NEGATIVE: 1
HEMOPTYSIS: 0
CONSTITUTIONAL NEGATIVE: 1
NAUSEA: 0
RESPIRATORY NEGATIVE: 1
BLURRED VISION: 0

## 2022-04-11 ASSESSMENT — FIBROSIS 4 INDEX: FIB4 SCORE: 0.66

## 2022-04-11 NOTE — PROGRESS NOTES
Subjective     Yamila Srinivasan is a 72 y.o. female who presents with Medical Clearance (Surgical Clearance/Dr. Mackay/scheduled 5/18) and Referral Needed (Referral to Derm)            1. Preop cardiovascular exam  Scheduled for right shoulder replacement in may  To do labs and cxr and ekg then  No prior anesthetic complications   No cp nor sob  Cleared pending labs    2. Internal derangement of right shoulder      Past Medical History:  No date: Arthritis  Past Surgical History:  No date: CHOLECYSTECTOMY  Social History    Tobacco Use      Smoking status: Never Smoker      Smokeless tobacco: Never Used    Vaping Use      Vaping Use: Never used    Alcohol use: No    Drug use: No    Review of patient's family history indicates:  Problem: Diabetes      Relation: Mother          Age of Onset: (Not Specified)      Current Outpatient Medications: •  Cholecalciferol (VITAMIN D) 125 MCG (5000 UT) Cap, Take 5,000 Units by mouth every day., Disp: 90 Cap, Rfl: 1    Patient was instructed on the use of medications, either prescriptions or OTC and informed on when the appropriate follow up time period should be. In addition, patient was also instructed that should any acute worsening occur that they should notify this clinic asap or call 911.          Review of Systems   Constitutional: Negative.  Negative for chills and fever.   HENT: Negative.  Negative for hearing loss.    Eyes: Negative.  Negative for blurred vision and double vision.   Respiratory: Negative.  Negative for cough and hemoptysis.    Cardiovascular: Negative.  Negative for chest pain and palpitations.   Gastrointestinal: Negative.  Negative for heartburn and nausea.   Genitourinary: Negative.  Negative for dysuria.   Musculoskeletal: Positive for joint pain. Negative for myalgias.   Skin: Negative.  Negative for rash.   Neurological: Negative.  Negative for dizziness, tingling and headaches.   Endo/Heme/Allergies: Negative.  Does not bruise/bleed easily.  "  Psychiatric/Behavioral: Negative.  Negative for depression and suicidal ideas.   All other systems reviewed and are negative.             Objective     /68 (BP Location: Left arm, Patient Position: Sitting, BP Cuff Size: Adult)   Pulse 85   Temp 36.4 °C (97.6 °F) (Temporal)   Resp 12   Ht 1.651 m (5' 5\")   Wt 79.4 kg (175 lb)   SpO2 96%   Breastfeeding No   BMI 29.12 kg/m²      Physical Exam  Vitals and nursing note reviewed.   Constitutional:       General: She is not in acute distress.     Appearance: She is well-developed. She is not diaphoretic.   HENT:      Head: Normocephalic and atraumatic.      Mouth/Throat:      Pharynx: No oropharyngeal exudate.   Eyes:      Pupils: Pupils are equal, round, and reactive to light.   Cardiovascular:      Rate and Rhythm: Normal rate and regular rhythm.      Heart sounds: Normal heart sounds. No murmur heard.    No friction rub. No gallop.   Pulmonary:      Effort: Pulmonary effort is normal. No respiratory distress.      Breath sounds: Normal breath sounds. No wheezing or rales.   Chest:      Chest wall: No tenderness.   Neurological:      Mental Status: She is alert and oriented to person, place, and time.   Psychiatric:         Behavior: Behavior normal.         Thought Content: Thought content normal.         Judgment: Judgment normal.                             Assessment & Plan        1. Preop cardiovascular exam      2. Internal derangement of right shoulder                  "

## 2022-11-09 ENCOUNTER — PATIENT MESSAGE (OUTPATIENT)
Dept: HEALTH INFORMATION MANAGEMENT | Facility: OTHER | Age: 72
End: 2022-11-09

## 2023-04-12 ENCOUNTER — OFFICE VISIT (OUTPATIENT)
Dept: MEDICAL GROUP | Facility: PHYSICIAN GROUP | Age: 73
End: 2023-04-12
Payer: MEDICARE

## 2023-04-12 VITALS
HEART RATE: 72 BPM | BODY MASS INDEX: 28.82 KG/M2 | SYSTOLIC BLOOD PRESSURE: 138 MMHG | RESPIRATION RATE: 14 BRPM | DIASTOLIC BLOOD PRESSURE: 80 MMHG | HEIGHT: 65 IN | WEIGHT: 173 LBS | OXYGEN SATURATION: 93 % | TEMPERATURE: 96.6 F

## 2023-04-12 DIAGNOSIS — R42 DIZZY SPELLS: ICD-10-CM

## 2023-04-12 PROCEDURE — 99214 OFFICE O/P EST MOD 30 MIN: CPT | Performed by: FAMILY MEDICINE

## 2023-04-12 PROCEDURE — 93000 ELECTROCARDIOGRAM COMPLETE: CPT | Performed by: FAMILY MEDICINE

## 2023-04-12 ASSESSMENT — PATIENT HEALTH QUESTIONNAIRE - PHQ9
CLINICAL INTERPRETATION OF PHQ2 SCORE: 1
SUM OF ALL RESPONSES TO PHQ QUESTIONS 1-9: 6
5. POOR APPETITE OR OVEREATING: 0 - NOT AT ALL

## 2023-04-12 ASSESSMENT — ENCOUNTER SYMPTOMS
HEMOPTYSIS: 0
BRUISES/BLEEDS EASILY: 0
CONSTITUTIONAL NEGATIVE: 1
CHILLS: 0
PALPITATIONS: 0
MUSCULOSKELETAL NEGATIVE: 1
BLURRED VISION: 0
NAUSEA: 0
COUGH: 0
DIZZINESS: 1
FEVER: 0
CARDIOVASCULAR NEGATIVE: 1
HEADACHES: 0
DOUBLE VISION: 0
MYALGIAS: 0
HEARTBURN: 0
EYES NEGATIVE: 1
PSYCHIATRIC NEGATIVE: 1
TINGLING: 0
GASTROINTESTINAL NEGATIVE: 1
DEPRESSION: 0
RESPIRATORY NEGATIVE: 1

## 2023-04-12 ASSESSMENT — FIBROSIS 4 INDEX: FIB4 SCORE: 0.65

## 2023-04-12 NOTE — PROGRESS NOTES
Subjective     Yamila Srinivasan is a 73 y.o. female who presents with Follow-Up (Dizziness , hands shaking, outside edge of left foot  and both hurt to stand on [id number])            1. Dizzy spells  Patient with c/o dizzy episodes with change of position over the past month, no cp nor soboe. Never had this before. Today on exam neg tilt and normal ekg will get echo and carotids   EC-ECHOCARDIOGRAM COMPLETE W/O CONT; Future   US-CAROTID DOPPLER BILAT; Future    Past Medical History:  No date: Arthritis  Past Surgical History:  No date: CHOLECYSTECTOMY  Social History    Tobacco Use      Smoking status: Never      Smokeless tobacco: Never    Vaping Use      Vaping Use: Never used    Alcohol use: No    Drug use: No    Review of patient's family history indicates:      Current Outpatient Medications: ·  Cholecalciferol (VITAMIN D) 125 MCG (5000 UT) Cap, Take 5,000 Units by mouth every day., Disp: 90 Cap, Rfl: 1    Patient was instructed on the use of medications, either prescriptions or OTC and informed on when the appropriate follow up time period should be. In addition, patient was also instructed that should any acute worsening occur that they should notify this clinic asap or call 911.            Review of Systems   Constitutional: Negative.  Negative for chills and fever.   HENT: Negative.  Negative for hearing loss.    Eyes: Negative.  Negative for blurred vision and double vision.   Respiratory: Negative.  Negative for cough and hemoptysis.    Cardiovascular: Negative.  Negative for chest pain and palpitations.   Gastrointestinal: Negative.  Negative for heartburn and nausea.   Genitourinary: Negative.  Negative for dysuria.   Musculoskeletal: Negative.  Negative for myalgias.   Skin: Negative.  Negative for rash.   Neurological:  Positive for dizziness. Negative for tingling and headaches.   Endo/Heme/Allergies: Negative.  Does not bruise/bleed easily.   Psychiatric/Behavioral: Negative.  Negative for  "depression and suicidal ideas.    All other systems reviewed and are negative.           Objective     /80 (BP Location: Left arm, Patient Position: Sitting, BP Cuff Size: Adult)   Pulse 72   Temp 35.9 °C (96.6 °F) (Temporal)   Resp 14   Ht 1.651 m (5' 5\")   Wt 78.5 kg (173 lb)   SpO2 93%   BMI 28.79 kg/m²      Physical Exam  Vitals and nursing note reviewed.   Constitutional:       General: She is not in acute distress.     Appearance: She is well-developed. She is not diaphoretic.   HENT:      Head: Normocephalic and atraumatic.      Mouth/Throat:      Pharynx: No oropharyngeal exudate.   Eyes:      Pupils: Pupils are equal, round, and reactive to light.   Cardiovascular:      Rate and Rhythm: Normal rate and regular rhythm.      Heart sounds: Normal heart sounds. No murmur heard.    No friction rub. No gallop.   Pulmonary:      Effort: Pulmonary effort is normal. No respiratory distress.      Breath sounds: Normal breath sounds. No wheezing or rales.   Chest:      Chest wall: No tenderness.   Neurological:      Mental Status: She is alert and oriented to person, place, and time.   Psychiatric:         Behavior: Behavior normal.         Thought Content: Thought content normal.         Judgment: Judgment normal.                           Assessment & Plan        1. Dizzy spells  - EC-ECHOCARDIOGRAM COMPLETE W/O CONT; Future  - US-CAROTID DOPPLER BILAT; Future                  "

## 2023-04-18 ENCOUNTER — TELEPHONE (OUTPATIENT)
Dept: MEDICAL GROUP | Facility: PHYSICIAN GROUP | Age: 73
End: 2023-04-18
Payer: MEDICARE

## 2023-04-18 NOTE — TELEPHONE ENCOUNTER
Patient says imaging is requesting Carotid  indications in order for pt to schedule for carotid doppler.

## 2023-04-24 ENCOUNTER — HOSPITAL ENCOUNTER (OUTPATIENT)
Dept: RADIOLOGY | Facility: MEDICAL CENTER | Age: 73
End: 2023-04-24
Attending: FAMILY MEDICINE
Payer: MEDICARE

## 2023-04-24 ENCOUNTER — ANCILLARY PROCEDURE (OUTPATIENT)
Dept: CARDIOLOGY | Facility: MEDICAL CENTER | Age: 73
End: 2023-04-24
Attending: FAMILY MEDICINE
Payer: MEDICARE

## 2023-04-24 DIAGNOSIS — R42 DIZZY SPELLS: ICD-10-CM

## 2023-04-24 LAB
LV EJECT FRACT  99904: 65
LV EJECT FRACT MOD 2C 99903: 68.83
LV EJECT FRACT MOD 4C 99902: 72.86
LV EJECT FRACT MOD BP 99901: 71.8

## 2023-04-24 PROCEDURE — 93306 TTE W/DOPPLER COMPLETE: CPT

## 2023-04-24 PROCEDURE — 93306 TTE W/DOPPLER COMPLETE: CPT | Mod: 26 | Performed by: INTERNAL MEDICINE

## 2023-04-24 PROCEDURE — 93880 EXTRACRANIAL BILAT STUDY: CPT

## 2023-04-24 PROCEDURE — 93880 EXTRACRANIAL BILAT STUDY: CPT | Mod: 26 | Performed by: INTERNAL MEDICINE

## 2023-05-09 ENCOUNTER — OFFICE VISIT (OUTPATIENT)
Dept: MEDICAL GROUP | Facility: PHYSICIAN GROUP | Age: 73
End: 2023-05-09
Payer: MEDICARE

## 2023-05-09 VITALS
OXYGEN SATURATION: 96 % | SYSTOLIC BLOOD PRESSURE: 130 MMHG | TEMPERATURE: 98 F | HEART RATE: 77 BPM | DIASTOLIC BLOOD PRESSURE: 70 MMHG | BODY MASS INDEX: 29.82 KG/M2 | WEIGHT: 179 LBS | RESPIRATION RATE: 14 BRPM | HEIGHT: 65 IN

## 2023-05-09 DIAGNOSIS — R42 DIZZY SPELLS: ICD-10-CM

## 2023-05-09 DIAGNOSIS — Z78.0 POST-MENOPAUSAL: ICD-10-CM

## 2023-05-09 PROCEDURE — 99213 OFFICE O/P EST LOW 20 MIN: CPT | Performed by: FAMILY MEDICINE

## 2023-05-09 RX ORDER — CHOLECALCIFEROL (VITAMIN D3) 125 MCG
500 CAPSULE ORAL DAILY
COMMUNITY

## 2023-05-09 RX ORDER — MAGNESIUM GLUCONATE 27 MG(500)
500 TABLET ORAL 3 TIMES DAILY
COMMUNITY

## 2023-05-09 RX ORDER — MULTIVIT WITH MINERALS/LUTEIN
TABLET ORAL
COMMUNITY

## 2023-05-09 RX ORDER — VITAMIN E 268 MG
400 CAPSULE ORAL DAILY
COMMUNITY

## 2023-05-09 RX ORDER — CALCIUM ACETATE 667 MG/1
1334 CAPSULE ORAL
COMMUNITY

## 2023-05-09 ASSESSMENT — ENCOUNTER SYMPTOMS
FEVER: 0
HEARTBURN: 0
DEPRESSION: 0
DOUBLE VISION: 0
NAUSEA: 0
MUSCULOSKELETAL NEGATIVE: 1
DIZZINESS: 1
HEADACHES: 0
RESPIRATORY NEGATIVE: 1
MYALGIAS: 0
CHILLS: 0
BRUISES/BLEEDS EASILY: 0
CARDIOVASCULAR NEGATIVE: 1
HEMOPTYSIS: 0
PSYCHIATRIC NEGATIVE: 1
BLURRED VISION: 0
CONSTITUTIONAL NEGATIVE: 1
GASTROINTESTINAL NEGATIVE: 1
EYES NEGATIVE: 1
TINGLING: 0
COUGH: 0
PALPITATIONS: 0

## 2023-05-09 ASSESSMENT — FIBROSIS 4 INDEX: FIB4 SCORE: 0.65

## 2023-05-09 NOTE — PROGRESS NOTES
Subjective     Yamila Srinivasan is a 73 y.o. female who presents with Annual Exam            Still with some dizzy sx when she moves her head, echo and carotid normal will send to ent for eval    1. Post-menopausal     DS-BONE DENSITY STUDY (DEXA)    2. Dizzy spells     Referral to ENT    Past Medical History:  No date: Arthritis  Past Surgical History:  No date: CHOLECYSTECTOMY  Social History    Tobacco Use      Smoking status: Never      Smokeless tobacco: Never    Vaping Use      Vaping Use: Never used    Alcohol use: No    Drug use: No    Review of patient's family history indicates:      Current Outpatient Medications: ·  Ascorbic Acid (VITAMIN C) 1000 MG Tab, Take  by mouth., Disp: , Rfl: ·  magnesium gluconate (MAG-G) 500 MG tablet, Take 500 mg by mouth 3 times a day., Disp: , Rfl: ·  calcium acetate (PHOS-LO) 667 MG Cap, Take 1,334 mg by mouth 3 times a day before meals., Disp: , Rfl: ·  vitamin e (VITAMIN E) 400 UNIT Cap, Take 400 Units by mouth every day., Disp: , Rfl: ·  cyanocobalamin (VITAMIN B-12) 500 MCG Tab, Take 500 mcg by mouth every day., Disp: , Rfl: ·  Cholecalciferol (VITAMIN D) 125 MCG (5000 UT) Cap, Take 5,000 Units by mouth every day., Disp: 90 Cap, Rfl: 1    Patient was instructed on the use of medications, either prescriptions or OTC and informed on when the appropriate follow up time period should be. In addition, patient was also instructed that should any acute worsening occur that they should notify this clinic asap or call 911.            Review of Systems   Constitutional: Negative.  Negative for chills and fever.   HENT: Negative.  Negative for hearing loss.    Eyes: Negative.  Negative for blurred vision and double vision.   Respiratory: Negative.  Negative for cough and hemoptysis.    Cardiovascular: Negative.  Negative for chest pain and palpitations.   Gastrointestinal: Negative.  Negative for heartburn and nausea.   Genitourinary: Negative.  Negative for dysuria.  "  Musculoskeletal: Negative.  Negative for myalgias.   Skin: Negative.  Negative for rash.   Neurological:  Positive for dizziness. Negative for tingling and headaches.   Endo/Heme/Allergies: Negative.  Does not bruise/bleed easily.   Psychiatric/Behavioral: Negative.  Negative for depression and suicidal ideas.    All other systems reviewed and are negative.           Objective     /70   Pulse 77   Temp 36.7 °C (98 °F) (Temporal)   Resp 14   Ht 1.651 m (5' 5\")   Wt 81.2 kg (179 lb)   SpO2 96%   BMI 29.79 kg/m²      Physical Exam  Vitals and nursing note reviewed.   Constitutional:       General: She is not in acute distress.     Appearance: She is well-developed. She is not diaphoretic.   HENT:      Head: Normocephalic and atraumatic.      Mouth/Throat:      Pharynx: No oropharyngeal exudate.   Eyes:      Pupils: Pupils are equal, round, and reactive to light.   Cardiovascular:      Rate and Rhythm: Normal rate and regular rhythm.      Heart sounds: Normal heart sounds. No murmur heard.    No friction rub. No gallop.   Pulmonary:      Effort: Pulmonary effort is normal. No respiratory distress.      Breath sounds: Normal breath sounds. No wheezing or rales.   Chest:      Chest wall: No tenderness.   Neurological:      Mental Status: She is alert and oriented to person, place, and time.   Psychiatric:         Behavior: Behavior normal.         Thought Content: Thought content normal.         Judgment: Judgment normal.                           Assessment & Plan        1. Post-menopausal    - DS-BONE DENSITY STUDY (DEXA)    2. Dizzy spells    - Referral to ENT                  "